# Patient Record
Sex: FEMALE | Race: BLACK OR AFRICAN AMERICAN | Employment: OTHER | URBAN - METROPOLITAN AREA
[De-identification: names, ages, dates, MRNs, and addresses within clinical notes are randomized per-mention and may not be internally consistent; named-entity substitution may affect disease eponyms.]

---

## 2018-09-12 ENCOUNTER — HOSPITAL ENCOUNTER (OUTPATIENT)
Dept: PHYSICAL THERAPY | Age: 73
Discharge: HOME | End: 2018-09-12
Payer: MEDICARE

## 2018-09-12 PROCEDURE — 97110 THERAPEUTIC EXERCISES: CPT

## 2018-09-12 PROCEDURE — 97162 PT EVAL MOD COMPLEX 30 MIN: CPT

## 2018-09-12 PROCEDURE — G8984 CARRY CURRENT STATUS: HCPCS

## 2018-09-12 PROCEDURE — G8985 CARRY GOAL STATUS: HCPCS

## 2018-09-12 NOTE — PROGRESS NOTES
Alyssa Pineda [address] [facility] PHYSICAL THERAPY 
[address] [facility] [address], [place], [address] - Phone: [phone]  Fax: [phone] PLAN OF CARE / STATEMENT OF MEDICAL NECESSITY FOR PHYSICAL THERAPY SERVICES Patient Name: Lisa Elliott : 1945 Medical  
Diagnosis: Left shoulder pain [M25.512] Treatment Diagnosis: Left shoulder pain Onset Date: 1 year ago AGE: 74 y.o. Referral Source: Jo Ann Tracy MD [facility]): 2018 Prior Hospitalization: See medical history Provider #: [id number] Prior Level of Function: Progressively limited left shoulder function Comorbidities: Hx throat cancer, HTN, Asthma Medications: Verified on Patient Summary List  
The Plan of Care and following information is based on the information from the initial evaluation.  
======================================================================================= Assessment / key information:  Patient is a 74 y.o. female who presents with chief complaint of left shoulder pain onset approximately 1 year ago. Patient denies numbness/tingling into LUE and demo no evidence of radicular symptoms during evaluation. Diagnosis: Left mechanical shoulder pain with capsular restrictions Posture: Significant Dowager's hump, left depression/rounded shoulder, left scapular anterior tilt and protraction, forward head with visible left/anterior cervical muscle turgor and tightness due to history of skin graft from left breast 6 years ago (). Impairments: Decreased left shoulder AROM/PROM in all planes (see objective below), posterior capsule tightness, dec UE strength left UE vs right UE. Functional limitations: Doing hair, reaching, ADLs, grooming Patient would benefit from skilled PT services to address these issues and improve the above mentioned impairments.   Thank you for this referral  
 
 
Objective Data: 
Shoulder ROM:  [] Unable to assess at this time  
  
 Painful Arc: [] Yes  [x] No  
                                         AROM                                                              PROM 
  Left Right   Left Right Flexion 95 145 Flexion 120 155 Extension NT NT Extension NT NT Scaption/ABD 90 140 Scaption/ 162 ER @ 45 Degrees 62 80 ER @ 0 Degrees 66 87 ER @ 90 Degrees NT NT ER @ 90 Degrees NT NT  
IR @ 90 Degrees     IR @ 90 Degrees      
IR HBB PSIS T11        
Functional ER To ear T3        
  
====================================================================================== Eval Complexity: History: HIGH Complexity :3+ comorbidities / personal factors will impact the outcome/ POC Exam:MEDIUM Complexity : 3 Standardized tests and measures addressing body structure, function, activity limitation and / or participation in recreation  Presentation: MEDIUM Complexity : Evolving with changing characteristics  Clinical Decision Making:MEDIUM Complexity : FOTO score of 26-74Overall Complexity:MEDIUM Problem List: pain affecting function, decrease ROM, decrease strength, edema affecting function, impaired gait/ balance, decrease ADL/ functional abilitiies, decrease activity tolerance, decrease flexibility/ joint mobility and decrease transfer abilities Treatment Plan may include any combination of the following: Therapeutic exercise, Therapeutic activities, Neuromuscular re-education, Physical agent/modality, Manual therapy, Patient education, Self Care training, Functional mobility training and Home safety training Patient / Family readiness to learn indicated by: asking questions, trying to perform skills and interest 
Persons(s) to be included in education: patient (P) and family support person (FSP);list daughter Barriers to Learning/Limitations: yes;  sensory deficits-speech (tracheostomy) Measures taken:   
Patient Goal (s): \"avoid surgery. \"  
Patient self reported health status: good Rehabilitation Potential: good ? Short Term Goals: To be accomplished in  3  Weeks: 1. Patient will be compliant with HEP in order to facilitate progression of therapeutic exercise and improve mobility 2. Patient will improve left shoulder flexion PROM >/= 145 degrees to improve ease with overhead activities 3. Patient demonstrate left shoulder function ER to occiput in order to improve ease with doing ahwk ? Long Term Goals: To be accomplished in  6  Weeks: 1. Patient will be independent with HEP in order to demonstrate ability to perform therapeutic exercises and continue progressing functional mobility upon D/C 
2. Patient will report no difficulty with doing hair in order to improve ease with self care duties 3. Patient will improve FOTO score to 63% to demonstrate a meaningful improvement in functional mobility and increased quality of life 4. Patient will improve left shoulder flexion/scaption AROM >/= 140 degrees without pain to improve ease with reaching and overhead tasks. Frequency / Duration:   Patient to be seen  2-3  times per week for 4-6  weeks: 
Patient / Caregiver education and instruction: self care, activity modification and exercises G-Codes (GP): Carry [id number] Current  CK= 40-59%  Goal  CJ= 20-39%. The severity rating is based on the FOTO Score Therapist Signature: Deondre Sheffield DPT Date: 9/12/2018 Certification Period: 9/12/18 - 12/11/18 Time: 7:23 AM  
=========================================================================================== I certify that the above Physical Therapy Services are being furnished while the patient is under my care. I agree with the treatment plan and certify that this therapy is necessary. Physician Signature:       Date:      Time:  Please sign and return to [facility] [employer] or you may fax the signed copy to [phone]. Thank you.

## 2018-09-12 NOTE — PROGRESS NOTES
PHYSICAL THERAPY - DAILY TREATMENT NOTE Patient Name: Raman Hamlin        Date: 2018 : 1945   YES Patient  Verified Visit #:   1     Insurance: Payor: Cullen Ice / Plan: Marina Caballero / Product Type: Medicare / In time: 11:05 Out time: 11:45 Total Treatment Time: 40 min Medicare Time Tracking (below) Total Timed Codes (min):  10 1:1 Treatment Time:  10 TREATMENT AREA =  Left shoulder pain SUBJECTIVE Pain Level (on 0 to 10 scale):  0  / 10 Medication Changes/New allergies or changes in medical history, any new surgeries or procedures? NO    If yes, update Summary List  
Subjective Functional Status/Changes:  []  No changes reported Patient's main complaint: left shoulder stiffness with AROM When did symptoms begin and how: 1 year ago insidious onset What reproduces symptoms: lifting arm especially to do hair What alleviates symptoms: Rest, Advil Occupation: Retired Goals: Do hair Improve ROM Decrease stiffness OBJECTIVE Diagnostic Tests: [] Lab work [] X-rays    [] CT [] MRI     [] Other: 
Results: 
 
Physical Therapy Evaluation - Shoulder A. Cervical Spine Dermatomes WNL [x] C/S ROM WNL [] Comments: Severely limited but symmetrical 
 
B. Observation: 
 
Posture: [x] Poor    [] Fair    [] Good    Describe: Atrophy of muscle tissue? [x] Yes  [] No  Location: 
 
Visible Defects? [x] Yes  [] No (ecchymosis, edema, inflammation, deformities) ROM:  [] Unable to assess at this time Painful Arc: [] Yes  [x] No  
                                         AROM                                                              PROM Left Right  Left Right Flexion 95 145 Flexion 120 155 Extension NT NT Extension NT NT Scaption/ABD 90 140 Scaption/ 162 ER @ 45 Degrees 62 80 ER @ 0 Degrees 66 87 ER @ 90 Degrees NT NT ER @ 90 Degrees NT NT  
IR @ 90 Degrees   IR @ 90 Degrees IR HBB PSIS T11 Functional ER To ear T3 End Feel Hard:  [x] Yes  [] No  
 
Strength:   [] Unable to assess at this time L (1-5) R (1-5) Pain Shoulder shrug 5 5 [] Yes   [x] No  
Abduction 4- 4 [] Yes   [x] No  
External Rotators 4- 4+ [] Yes   [x] No  
Internal Rotators 4- 4+ [] Yes   [x] No  
Supraspinatus (scapular plane) 4- 4 [] Yes   [x] No  
 
Palpation [] Min  [x] Mod  [] Severe    Location: posterior RTC 
 
10 min Therapeutic Exercise:  [x]  See flow sheet Rationale:      increase ROM, increase strength, improve coordination, improve balance and increase proprioception to improve the patients ability to perform ADLs and overhead activities with increased ease 
 
throughout min Patient Education:  Paul Mack []  Progressed/Changed HEP based on: Other Objective/Functional Measures: 
 
See objective measures above Post Treatment Pain Level (on 0 to 10) scale:   0  / 10 ASSESSMENT Assessment/Changes in Function:  
 
Patient History: High (Hx throat cancer, Age, Chronicity) Examination (low 1-2, mod 3+, high 4+): Moderate Clinical Presentation (low stable or uncomplicated, mod evolving or changing, high unstable or unpredictable): Moderate Clinical Decision Making (low , mod 26-74, high 1-25): FOTO Moderate 
  
[]  See Progress Note/Recertification Patient will continue to benefit from skilled PT services to modify and progress therapeutic interventions, address functional mobility deficits, address ROM deficits, address strength deficits, analyze and address soft tissue restrictions, analyze and cue movement patterns, analyze and modify body mechanics/ergonomics, assess and modify postural abnormalities and instruct in home and community integration to attain remaining goals. to attain remaining goals. Progress toward goals / Updated goals: 
 
See POC PLAN 
 
 [x]  Upgrade activities as tolerated YES Continue plan of care  
[]  Discharge due to :   
[]  Other:   
 
Therapist: Aakash Valdez DPT Date: 9/12/2018 Time: 7:23 AM

## 2018-09-19 ENCOUNTER — HOSPITAL ENCOUNTER (OUTPATIENT)
Dept: PHYSICAL THERAPY | Age: 73
Discharge: HOME | End: 2018-09-19
Payer: MEDICARE

## 2018-09-19 PROCEDURE — 97110 THERAPEUTIC EXERCISES: CPT

## 2018-09-19 PROCEDURE — 97140 MANUAL THERAPY 1/> REGIONS: CPT

## 2018-09-24 ENCOUNTER — HOSPITAL ENCOUNTER (OUTPATIENT)
Dept: PHYSICAL THERAPY | Age: 73
Discharge: HOME | End: 2018-09-24
Payer: MEDICARE

## 2018-09-24 PROCEDURE — 97110 THERAPEUTIC EXERCISES: CPT

## 2018-09-24 NOTE — PROGRESS NOTES
PHYSICAL THERAPY - DAILY TREATMENT NOTE Patient Name: Radha Snyedr        Date: 2018 : 1945   YES Patient  Verified Visit #:   3     Insurance: Payor: Lori Breath / Plan: Marina Caballero / Product Type: Medicare / In time: 11:00 Out time: 11:48 Total Treatment Time: 40 Medicare/BCBS [place] Time Tracking (below) Total Timed Codes (min):  40 1:1 Treatment Time:  40 TREATMENT AREA =  Left shd SUBJECTIVE Pain Level (on 0 to 10 scale):  0  / 10 Medication Changes/New allergies or changes in medical history, any new surgeries or procedures? NO    If yes, update Summary List  
Subjective Functional Status/Changes:  []  No changes reported \"I did good after last time. I wasn't sore\" OBJECTIVE 
 
38 
(30) min Therapeutic Exercise:  [x]  See flow sheet Rationale:      increase ROM, increase strength and posture to improve the patients ability to maximize function 10 min Manual Therapy: PROM to left shd, flex, abd and ER, STM to left UT and periscapular framing Rationale:      decrease pain, increase ROM and increase tissue extensibility to improve patient's ability to maximize posture and ROM 
 
 min Patient Education:  YES  Reviewed HEP []  Progressed/Changed HEP based on:   Cont HEP Other Objective/Functional Measures: 
Pt required break during session for rest room and tracheostomy care Left shd flex PROM 155deg, ABD 135deg, ER 60deg Post Treatment Pain Level (on 0 to 10) scale:   0  / 10 ASSESSMENT Assessment/Changes in Function:  
 
Poor kinesthetic sense, requires frequent cues for form and decreased tension  
  
[]  See Progress Note/Recertification Patient will continue to benefit from skilled PT services to analyze,, cue,, progress,, modify,, demonstrate,, instruct, and address, movement patterns,, therapeutic interventions,, postural abnormalities,, soft tissue restrictions,, ROM,, strength,, functional mobility,, body mechanics/ergonomics, and home and community integration, to attain remaining goals. Progress toward goals / Updated goals: · Short Term Goals: To be accomplished in  3  Weeks: 1. Patient will be compliant with HEP in order to facilitate progression of therapeutic exercise and improve mobility 2. Patient will improve left shoulder flexion PROM >/= 145 degrees to improve ease with overhead activitiesAdded Tband rows for improved posture to facilitate increased flex AROM-155deg today 3. Patient demonstrate left shoulder function ER to occiput in order to improve ease with doing hawk 
  
· Long Term Goals: To be accomplished in  6  Weeks: 1. Patient will be independent with HEP in order to demonstrate ability to perform therapeutic exercises and continue progressing functional mobility upon D/C 
2. Patient will report no difficulty with doing hair in order to improve ease with self care duties 3. Patient will improve FOTO score to 63% to demonstrate a meaningful improvement in functional mobility and increased quality of life 4. Patient will improve left shoulder flexion/scaption AROM >/= 140 degrees without pain to improve ease with reaching and overhead tasks. PLAN 
 
[]  Upgrade activities as tolerated YES Continue plan of care  
[]  Discharge due to :   
[]  Other:   
 
Therapist: Andrei Clay DPT Date: 9/24/2018 Time: 11:24 AM  
Future Appointments Date Time Provider An Almanza 9/26/2018 2:00 PM [facility] AT [facility]

## 2018-09-26 ENCOUNTER — HOSPITAL ENCOUNTER (OUTPATIENT)
Dept: PHYSICAL THERAPY | Age: 73
Discharge: HOME | End: 2018-09-26
Payer: MEDICARE

## 2018-09-26 PROCEDURE — 97110 THERAPEUTIC EXERCISES: CPT

## 2018-09-26 PROCEDURE — 97140 MANUAL THERAPY 1/> REGIONS: CPT

## 2018-09-26 NOTE — PROGRESS NOTES
PHYSICAL THERAPY - DAILY TREATMENT NOTE Patient Name: Peter Milan        Date: 2018 : 1945   YES Patient  Verified Visit #:   4     Insurance: Payor: Dominik Robles / Plan: Marina Tierneyshelby / Product Type: Medicare / In time: 2:00 Out time: 2:46 Total Treatment Time: 46 Medicare/BCBS Time Tracking (below) Total Timed Codes (min):  46 1:1 Treatment Time:  45 TREATMENT AREA =  Left shoulder pain [M25.512] SUBJECTIVE Pain Level (on 0 to 10 scale):  0  / 10 Medication Changes/New allergies or changes in medical history, any new surgeries or procedures? NO     If yes, update Summary List  
Subjective Functional Status/Changes:  []  No changes reported \"I feel a little looser. Easier to do my hair. \" OBJECTIVE 
 
33 (25) min Therapeutic Exercise:  [x]  See flow sheet Rationale:      increase ROM and increase strength to improve the patients ability to perform ADL's with improved overhead AROM. 13 min Manual Therapy: PROM flexion/abduction with contract-relax Rationale:      decrease pain, increase ROM and increase tissue extensibility to improve patient's ability to perform grooming ADL's with improved efficiency. min Patient Education:  YES  Reviewed HEP []  Progressed/Changed HEP based on:   Patient reports compliance Other Objective/Functional Measures: 
 
Pt reports discomfort and therefore an empty end feel beyond 155 degrees of flexion or abduction. Pt demonstrates minimal deficits with ER at this time for AROM. Progressed periscapular strengthening with supine exercises. Post Treatment Pain Level (on 0 to 10) scale:   0  / 10 ASSESSMENT Assessment/Changes in Function: WIll continue to progress strengthening/AROM per activity tolerance. Pt reports decreased symptoms with AROM. []  See Progress Note/Recertification Patient will continue to benefit from skilled PT services to modify and progress therapeutic interventions, address functional mobility deficits, address ROM deficits, address strength deficits, analyze and address soft tissue restrictions, analyze and cue movement patterns, analyze and modify body mechanics/ergonomics and assess and modify postural abnormalities to attain remaining goals. Progress toward goals / Updated goals: Addressed AROM with manual interventions. PLAN [x]  Upgrade activities as tolerated YES Continue plan of care  
[]  Discharge due to :   
[]  Other:   
 
Therapist: Margarette Espinoza Date: 9/26/2018 Time: 5:53 PM  
 
Future Appointments Date Time Provider An Almanza 10/1/2018 1:30 PM [facility]  
10/3/2018 1:00 PM Willy Wilson PT [facility]  
10/8/2018 1:30 PM [facility]  
10/10/2018 11:30 AM [facility]

## 2018-10-01 ENCOUNTER — HOSPITAL ENCOUNTER (OUTPATIENT)
Dept: PHYSICAL THERAPY | Age: 73
End: 2018-10-01
Payer: MEDICARE

## 2018-10-03 ENCOUNTER — HOSPITAL ENCOUNTER (OUTPATIENT)
Dept: PHYSICAL THERAPY | Age: 73
Discharge: HOME | End: 2018-10-03
Payer: MEDICARE

## 2018-10-03 PROCEDURE — 97110 THERAPEUTIC EXERCISES: CPT

## 2018-10-03 NOTE — PROGRESS NOTES
PHYSICAL THERAPY - DAILY TREATMENT NOTE    Patient Name: Barb Medel        Date: 10/3/2018  : 1945   YES Patient  Verified  Visit #:   5     Insurance: Payor: Noble Hahn / Plan: [place] PART [place] & [place] / Product Type: Medicare /      In time: 1:00 Out time: 1:40   Total Treatment Time: 40     Medicare/BCBS [place] Time Tracking (below)   Total Timed Codes (min):  40 1:1 Treatment Time:  40     TREATMENT AREA =  Left shd    SUBJECTIVE    Pain Level (on 0 to 10 scale):  0  / 10   Medication Changes/New allergies or changes in medical history, any new surgeries or procedures? NO    If yes, update Summary List   Subjective Functional Status/Changes:  []  No changes reported     \"I have more times when its better\"          OBJECTIVE      40 min Therapeutic Exercise:  [x]  See flow sheet   Rationale:      increase ROM and increase strength to improve the patients ability to perform ADLs      min Patient Education:  YES  Reviewed HEP   []  Progressed/Changed HEP based on:   Cont HEP     Other Objective/Functional Measures:    Pt continues to require assit with rep count to stay on task  Cont poor kinesthetic sense, require tactile, verbal and demonstration to perfrorm TE correctly       Post Treatment Pain Level (on 0 to 10) scale:   0  / 10     ASSESSMENT    Assessment/Changes in Function:     Pt reporting no pain with TE, only requires mod-max cueing indicating poor carry over at home for correct technique     []  See Progress Note/Recertification   Patient will continue to benefit from skilled PT services to analyze,, cue,, progress,, modify,, demonstrate,, instruct, and address, movement patterns,, therapeutic interventions,, postural abnormalities,, soft tissue restrictions,, ROM,, strength,, functional mobility,, body mechanics/ergonomics, and home and community integration, to attain remaining goals. Progress toward goals / Updated goals: · Short Term Goals:  To be accomplished in  3  Weeks:  1. Patient will be compliant with HEP in order to facilitate progression of therapeutic exercise and improve mobility  2. Patient will improve left shoulder flexion PROM >/= 145 degrees to improve ease with overhead activities  3. Patient demonstrate left shoulder function ER to occiput in order to improve ease with doing hairPt reports \"a little bit better doing hair\"     · Long Term Goals: To be accomplished in  6  Weeks:  1. Patient will be independent with HEP in order to demonstrate ability to perform therapeutic exercises and continue progressing functional mobility upon D/C  2. Patient will report no difficulty with doing hair in order to improve ease with self care duties  3. Patient will improve FOTO score to 63% to demonstrate a meaningful improvement in functional mobility and increased quality of life  4.  Patient will improve left shoulder flexion/scaption AROM >/= 140 degrees without pain to improve ease with reaching and overhead tasks.        PLAN    []  Upgrade activities as tolerated YES Continue plan of care   []  Discharge due to :    []  Other:      Therapist: Immanuel Ornelas DPT    Date: 10/3/2018 Time: 1:30 PM   Future Appointments  Date Time Provider An Almanza   10/8/2018 1:30 PM [address] [place] [address]   10/10/2018 11:30 AM [address] [place] [address]

## 2018-10-08 ENCOUNTER — HOSPITAL ENCOUNTER (OUTPATIENT)
Dept: PHYSICAL THERAPY | Age: 73
Discharge: HOME | End: 2018-10-08
Payer: MEDICARE

## 2018-10-08 PROCEDURE — 97110 THERAPEUTIC EXERCISES: CPT

## 2018-10-08 PROCEDURE — 97140 MANUAL THERAPY 1/> REGIONS: CPT

## 2018-10-08 NOTE — PROGRESS NOTES
PHYSICAL THERAPY - DAILY TREATMENT NOTE    Patient Name: Donna Valdovinos        Date: 10/8/2018  : 1945   YES Patient  Verified  Visit #:     Insurance: Payor: Joceline Samuel / Plan: [place] MED[place] PART A & B / Product Type: Medicare /      In time: 1:30 Out time: 2:30   Total Treatment Time: 60     Medicare/BCBS Time Tracking (below)   Total Timed Codes (min):  60 1:1 Treatment Time:  60     TREATMENT AREA =  Left shoulder pain [M25.512]    SUBJECTIVE    Pain Level (on 0 to 10 scale):  0  / 10   Medication Changes/New allergies or changes in medical history, any new surgeries or procedures? NO     If yes, update Summary List   Subjective Functional Status/Changes:  []  No changes reported     \"I feel a little better. The pain isn't at bad when I stretch. I can do my hair better but its still tight. \"          OBJECTIVE    Modalities Rationale: To improve activity tolerance for exercise performance and ADL's.    min [] Estim, type/location:                                      []  att     []  unatt     []  w/US     []  w/ice    []  w/heat    min []  Mechanical Traction: type/lbs                   []  pro   []  sup   []  int   []  cont    []  before manual    []  after manual    min []  Ultrasound, settings/location:      min []  Iontophoresis w/ dexamethasone, location:                                               []  take home patch       []  in clinic   NA min []  Ice     []  Heat    location/position:     min []  Vasopneumatic Device, press/temp:     min []  Other:    [x] Skin assessment post-treatment (if applicable):   [x]  intact    []  redness- no adverse reaction     []redness - adverse reaction:    40 min Therapeutic Exercise:  [x]  See flow sheet   Rationale:      increase ROM and increase strength to improve the patients ability to perform ADL's with improved periscapular stability.      20 min Manual Therapy: IASTM with releaser tool F3 sunburst pattern applied to 2 different places along pectoral incision   Rationale:      decrease pain, increase ROM and increase tissue extensibility to improve patient's ability to improve overhead UE AROM. NA min Self-Care:    Rationale: To improve skin health and prevent increased edema. min Patient Education:  YES  Reviewed HEP   []  Progressed/Changed HEP based on:   Patient reports compliance     Other Objective/Functional Measures:    Pt reports no pain following IASTM and demonstrates a 10 degree improvement in flexion and scaption AROM in supine. Pt's MMT still limiting standing AROM. Added standing UE flexion and scaption to addressed MMT deficits. Post Treatment Pain Level (on 0 to 10) scale:   0  / 10     ASSESSMENT    Assessment/Changes in Function:     WIll continue to progress strengthening/AROM per activity tolerance. Will reassess NV.     []  See Progress Note/Recertification   Patient will continue to benefit from skilled PT services to modify and progress therapeutic interventions, address functional mobility deficits, address ROM deficits, address strength deficits, analyze and address soft tissue restrictions, analyze and cue movement patterns, analyze and modify body mechanics/ergonomics and assess and modify postural abnormalities to attain remaining goals. Progress toward goals / Updated goals: Will reassess NV. Addressed AROM with standing AROM exercises.      PLAN    [x]  Upgrade activities as tolerated YES Continue plan of care   []  Discharge due to :    []  Other:      Therapist: Tommie Sykes    Date: 10/8/2018 Time: 2:32 PM     Future Appointments  Date Time Provider An Almanza   10/10/2018 11:30 AM [address] [place] [address]

## 2018-10-10 ENCOUNTER — HOSPITAL ENCOUNTER (OUTPATIENT)
Dept: PHYSICAL THERAPY | Age: 73
Discharge: HOME | End: 2018-10-10
Payer: MEDICARE

## 2018-10-10 PROCEDURE — G8985 CARRY GOAL STATUS: HCPCS

## 2018-10-10 PROCEDURE — G8986 CARRY D/C STATUS: HCPCS

## 2018-10-10 PROCEDURE — 97110 THERAPEUTIC EXERCISES: CPT

## 2018-10-10 PROCEDURE — 97140 MANUAL THERAPY 1/> REGIONS: CPT

## 2018-10-10 NOTE — PROGRESS NOTES
PHYSICAL THERAPY - DAILY TREATMENT NOTE    Patient Name: Jaylen Plummer        Date: 10/10/2018  : 1945   YES Patient  Verified  Visit #:     Insurance: Payor: Ross Bello / Plan: [place] MEDICARE PART A & B / Product Type: Medicare /      In time: 11:45 Out time: 12:45   Total Treatment Time: 60     Medicare/BCBS Time Tracking (below)   Total Timed Codes (min):  60 1:1 Treatment Time:  60     TREATMENT AREA =  Left shoulder pain [M25.512]    SUBJECTIVE    Pain Level (on 0 to 10 scale):  0  / 10   Medication Changes/New allergies or changes in medical history, any new surgeries or procedures? NO     If yes, update Summary List   Subjective Functional Status/Changes:  []  No changes reported     \"I can reach the back of my head better. \"          OBJECTIVE    Modalities Rationale:  To improve activity tolerance for exercise performance and ADL's.    min [] Estim, type/location:                                      []  att     []  unatt     []  w/US     []  w/ice    []  w/heat    min []  Mechanical Traction: type/lbs                   []  pro   []  sup   []  int   []  cont    []  before manual    []  after manual    min []  Ultrasound, settings/location:      min []  Iontophoresis w/ dexamethasone, location:                                               []  take home patch       []  in clinic   NA min []  Ice     []  Heat    location/position:     min []  Vasopneumatic Device, press/temp:     min []  Other:    [x] Skin assessment post-treatment (if applicable):   [x]  intact    []  redness- no adverse reaction     []redness - adverse reaction:    35 min Therapeutic Exercise:  [x]  See flow sheet   Rationale:      increase ROM and increase strength to improve the patients ability to perform ADL's with improved AWROM     25 min Manual Therapy: IASTM with releaser tool F3 sunburst pattern applied to 2 different places along pectoral incision and axillary region, manual lymph drainage to head and neck left side   Rationale:      decrease pain, increase ROM, increase tissue extensibility and decrease edema  to improve patient's ability to perform overhead ADL's with improved flexibility. min Patient Education:  YES  Reviewed HEP   []  Progressed/Changed HEP based on:   Patient reports compliance     Other Objective/Functional Measures:    Pt demonstrates swelling on the left side of her head and face. Pt reports she reported symptoms to her pharmacist who suggested taking benadryl. Therapist educated the pt to call her PCP immediately after this appointment to report her reaction to spilling the hair dye. Demonstrated manual lymph drainage techniques to the pt to decrease symptoms. See progress note for report on UE. Post Treatment Pain Level (on 0 to 10) scale:   0  / 10     ASSESSMENT    Assessment/Changes in Function:     WIll continue to progress strengthening/AROM per activity tolerance. []  See Progress Note/Recertification   Patient will continue to benefit from skilled PT services to modify and progress therapeutic interventions, address functional mobility deficits, address ROM deficits, address strength deficits, analyze and address soft tissue restrictions, analyze and cue movement patterns, analyze and modify body mechanics/ergonomics and assess and modify postural abnormalities to attain remaining goals. Progress toward goals / Updated goals:    See progress note. Addressed AROM and PROM with manual interventions described above.      PLAN    [x]  Upgrade activities as tolerated YES Continue plan of care   []  Discharge due to :    []  Other:      Therapist: Robert Sinha    Date: 10/10/2018 Time: 4:26 PM     Future Appointments  Date Time Provider An Almanza   10/15/2018 11:30 AM [address] [place] [address] Drive   10/17/2018 1:30 PM Lexy Montoya PT [place] [address] [address]   10/23/2018 1:30 PM Lexy Montoya PT [address]   10/24/2018 1:00 PM Lexy Montoya PT [facility]   10/29/2018 11:30 AM [facility]   10/31/2018 1:30 PM Scooter Mora PT [facility]

## 2018-10-10 NOTE — PROGRESS NOTES
[facility] PHYSICAL THERAPY  [address] [place] [address], [address] - Phone: [phone]  Fax: [phone] [address]          Patient Name: Favio Lambert : 1945   Treatment/Medical Diagnosis: Left shoulder pain [M25.512]   Onset Date:     Referral Source: Sushil Mckeon MD Start of Care [facility]): 18   Prior Hospitalization: See Medical History Provider #: [id number]   Prior Level of Function: Progressively limited left shoulder function   Comorbidities: Hx throat cancer, HTN, Asthma   Medications: Verified on Patient Summary List   Visits from [facility]: 7 Missed Visits: 0     Goal/Measure of Progress Goal Met? 1. Patient demonstrate left shoulder function ER to occiput in order to improve ease with doing hawk   Status at last Eval: unable Current Status: able yes   2. Patient will improve left shoulder flexion PROM >/= 145 degrees to improve ease with overhead activities   Status at last Eval: 120 Current Status: 155 yes   3. Patient will improve left shoulder flexion/scaption AROM >/= 140 degrees without pain to improve ease with reaching and overhead tasks. Status at last Eval: 90-95 Current Status: 115 standing  140 supine progressing   4. Patient will improve FOTO score to 63% to demonstrate a meaningful improvement in functional mobility and increased quality of life   Status at last Eval: 49 Current Status: 68 yes     Therapy has consisted of Patient's POC has consisted of therex, therapeutic activities, manual therapy prn, modalities prn, pt. education, and a comprehensive HEP.  Treatment strategies used to address functional mobility deficits, ROM deficits, strength deficits, analyze and address soft tissue restrictions, analyze and cue movement patterns, analyze and modify body mechanics/ergonomics, assess and modify postural abnormalities and instruct in home and community integration. Key Functional Changes/Progress: Pt's FOTO score improved to 68 indicating 32% limitation in functional ability. The pt reports the most improved AROM tolerance following instrument-assisted soft tissue massage to break down scar tissue adhesions. The pt still demonstrates AROM deficits and would benefit from improved overhead AROM to improve efficiency with ADL's. Problem List: pain affecting function, decrease ROM, decrease strength, decrease ADL/ functional abilitiies, decrease activity tolerance and decrease flexibility/ joint mobility   Treatment Plan may include any combination of the following: Therapeutic exercise, Therapeutic activities, Neuromuscular re-education, Physical agent/modality, Manual therapy, Patient education and Self Care training   Goals for this certification period include and are to be achieved in   3-4  weeks:  1. Patient will be independent with HEP in order to demonstrate ability to perform therapeutic exercises and continue progressing functional mobility upon D/C  2. Patient will improve left shoulder flexion/scaption AROM >/= 140 degrees without pain to improve ease with reaching and overhead tasks. 3.   Patient will report no difficulty with doing hair in order to improve ease with self care duties  Frequency / Duration:   Patient to be seen   2-3   times per week for   4    weeks:  G-Codes (GP): Carry:  [id number] Goal  CJ= 20-39%   D/C  CJ= 20-39%. The severity rating is based on the FOTO Score    Assessments/Recommendations: The patient would continue to benefit from skilled interventions to address the above mentioned functional deficits. See above for more details. If you have any questions/comments please contact us directly at [phone]. Thank you for allowing us to assist in the care of your patient.     Therapist Signature: Joao Navarro DPT Date: 41/95/8212   Certification Period:  Reporting Period: 9/12/18 - 12/11/18 9/12/18 - 10/10/18 Time: 4:33 PM   NOTE TO PHYSICIAN:  PLEASE COMPLETE THE ORDERS BELOW AND FAX TO   [facility]: [phone]. If you are unable to process this request in 24 hours please contact our office: [phone].    ___ I have read the above report and request that my patient continue as recommended.   ___ I have read the above report and request that my patient continue therapy with the following changes/special instructions: ________________________________________________   ___ I have read the above report and request that my patient be discharged from therapy.      Physician Signature:        Date:       Time:

## 2018-10-15 ENCOUNTER — HOSPITAL ENCOUNTER (OUTPATIENT)
Dept: PHYSICAL THERAPY | Age: 73
Discharge: HOME | End: 2018-10-15
Payer: MEDICARE

## 2018-10-15 PROCEDURE — G8978 MOBILITY CURRENT STATUS: HCPCS

## 2018-10-15 PROCEDURE — G8979 MOBILITY GOAL STATUS: HCPCS

## 2018-10-15 PROCEDURE — 97110 THERAPEUTIC EXERCISES: CPT

## 2018-10-15 NOTE — PROGRESS NOTES
PHYSICAL THERAPY - DAILY TREATMENT NOTE    Patient Name: Anabel Bhatti        Date: 10/15/2018  : 1945   YES Patient  Verified  Visit #:     Insurance: Payor: Ozzie Bahena / Plan: [place] MEDICARE PART A & B / Product Type: Medicare /      In time: 11:25 Out time: 12:08   Total Treatment Time: 43     Medicare/BCBS Time Tracking (below)   Total Timed Codes (min):  43 1:1 Treatment Time:  28     TREATMENT AREA =  Left shoulder pain [M25.512]    SUBJECTIVE    Pain Level (on 0 to 10 scale):  0  / 10   Medication Changes/New allergies or changes in medical history, any new surgeries or procedures? NO     If yes, update Summary List   Subjective Functional Status/Changes:  []  No changes reported     \"I can lift my arm better, but my breast area was sore. \"          OBJECTIVE    43 (28) min Therapeutic Exercise:  [x]  See flow sheet   Rationale:      increase ROM and increase strength to improve the patients ability to perform ADL's with improved overhead mobility and stability      min Patient Education:  YES  Reviewed HEP   []  Progressed/Changed HEP based on:   Patient reports compliance     Other Objective/Functional Measures:    Pt can perform AAROM with assist of contralateral UE to 140 deg to perform eccentric lowering. Added eccentric lowering to pt's HEP. Performed eccentric lowering at the wall ladder as well. Post Treatment Pain Level (on 0 to 10) scale:   0  / 10     ASSESSMENT    Assessment/Changes in Function:     New G-Codes: Mobility Current CK, Goal CJ: Flexion AROM    WIll continue to progress strengthening/AROM per activity tolerance. Pt demonstrated improvement in AROM following AROM this visit. Will continue to address with HEP changes.      []  See Progress Note/Recertification   Patient will continue to benefit from skilled PT services to modify and progress therapeutic interventions, address functional mobility deficits, address ROM deficits, address strength deficits, analyze and address soft tissue restrictions, analyze and cue movement patterns, analyze and modify body mechanics/ergonomics and assess and modify postural abnormalities to attain remaining goals. Progress toward goals / Updated goals: Addressed LTG 2 with AAROM.      PLAN    [x]  Upgrade activities as tolerated YES Continue plan of care   []  Discharge due to :    []  Other:      Therapist: Eda Landis    Date: 10/15/2018 Time: 11:57 AM     Future Appointments  Date Time Provider An Almanza   10/17/2018 1:30 PM Sunday Partida [facility]   10/23/2018 1:30 PM Sunday Partida [facility]   10/24/2018 1:00 PM Sunday Partida [facility]   10/29/2018 11:30 AM Eda Landis [facility]   10/31/2018 1:30 PM Sunday Partida [facility]

## 2018-10-17 ENCOUNTER — HOSPITAL ENCOUNTER (OUTPATIENT)
Dept: PHYSICAL THERAPY | Age: 73
Discharge: HOME | End: 2018-10-17
Payer: MEDICARE

## 2018-10-17 PROCEDURE — 97110 THERAPEUTIC EXERCISES: CPT

## 2018-10-17 NOTE — PROGRESS NOTES
PHYSICAL THERAPY - DAILY TREATMENT NOTE    Patient Name: Teresa Long        Date: 10/17/2018  : 1945   YES Patient  Verified  Visit #:     Insurance: Payor: Jairo Filler / Plan: [place] MEDICARE PART A & B / Product Type: Medicare /      In time: 134 Out time: 2:10   Total Treatment Time: 36     Medicare/BCBS [place] Time Tracking (below)   Total Timed Codes (min):  36 1:1 Treatment Time:  8     TREATMENT AREA =  Left shd    SUBJECTIVE    Pain Level (on 0 to 10 scale):  0  / 10   Medication Changes/New allergies or changes in medical history, any new surgeries or procedures? NO    If yes, update Summary List   Subjective Functional Status/Changes:  []  No changes reported     \"Doing better\"          OBJECTIVE      36 min Therapeutic Exercise:  [x]  See flow sheet   Rationale:      increase ROM and increase strength to improve the patients ability to perform ADls with increased ease      min Patient Education:  YES  Reviewed HEP   []  Progressed/Changed HEP based on:   Cont HEP     Other Objective/Functional Measures: Added side<>side wall wash and wall push ups today     Post Treatment Pain Level (on 0 to 10) scale:   0  / 10     ASSESSMENT    Assessment/Changes in Function:     Increased IR ROM noted today     []  See Progress Note/Recertification   Patient will continue to benefit from skilled PT services to analyze,, cue,, progress,, modify,, demonstrate,, instruct, and address, movement patterns,, therapeutic interventions,, postural abnormalities,, soft tissue restrictions,, ROM,, strength,, functional mobility,, body mechanics/ergonomics, and home and community integration, to attain remaining goals. Progress toward goals / Updated goals:    1. Patient will be independent with HEP in order to demonstrate ability to perform therapeutic exercises and continue progressing functional mobility upon D/C  2.    Patient will improve left shoulder flexion/scaption AROM >/= 140 degrees without pain to improve ease with reaching and overhead tasks.   3.   Patient will report no difficulty with doing hair in order to improve ease with self care duties         PLAN    []  Upgrade activities as tolerated YES Continue plan of care   []  Discharge due to :    []  Other:      Therapist: Kayla Topete DPT    Date: 10/17/2018 Time: 3:09 PM     Future Appointments   Date Time Provider An Almanza   10/23/2018  1:30 PM Sheyla Drew [facility]   10/24/2018  1:00 PM Sheyla Drew [facility]   10/29/2018 11:30 AM Cedric [facility]   10/31/2018  1:30 PM Isaura Feldman [facility]

## 2018-10-23 ENCOUNTER — HOSPITAL ENCOUNTER (OUTPATIENT)
Dept: PHYSICAL THERAPY | Age: 73
Discharge: HOME | End: 2018-10-23
Payer: MEDICARE

## 2018-10-23 PROCEDURE — 97110 THERAPEUTIC EXERCISES: CPT

## 2018-10-23 PROCEDURE — 97140 MANUAL THERAPY 1/> REGIONS: CPT

## 2018-10-23 NOTE — PROGRESS NOTES
PHYSICAL THERAPY - DAILY TREATMENT NOTE    Patient Name: Demar Mancia        Date: 10/23/2018  : 1945   YES Patient  Verified  Visit #:   10   of   17  Insurance: Payor: Misti Katie / Plan: [place] MEDICARE PART A & B / Product Type: Medicare /      In time: 1:30 Out time: 225   Total Treatment Time: 55     Medicare/BCBS [place] Time Tracking (below)   Total Timed Codes (min):  55 1:1 Treatment Time:  40     TREATMENT AREA =  Left shd    SUBJECTIVE    Pain Level (on 0 to 10 scale):  0  / 10   Medication Changes/New allergies or changes in medical history, any new surgeries or procedures?     NO    If yes, update Summary List   Subjective Functional Status/Changes:  []  No changes reported     \"It feels good today\"          OBJECTIVE      40  (25) min Therapeutic Exercise:  [x]  See flow sheet   Rationale:      increase ROM and increase strength to improve the patients ability to perform ADLs with increased ease     15 min Manual Therapy: Scap mobs, STM to UT and left lateral pec, PROM to left shd   Rationale:      decrease pain, increase ROM and increase tissue extensibility to improve patient's ability to maximize functional mobility     min Patient Education:  YES  Reviewed HEP   []  Progressed/Changed HEP based on:   Cont HEP     Other Objective/Functional Measures:  PROM on small red wedge for Fowlers  VC for form throughout session  Challenged keeping elbow at side for ER TBand  LImited ROM noted with open book at wall     Post Treatment Pain Level (on 0 to 10) scale:   0  / 10     ASSESSMENT    Assessment/Changes in Function:     Pt with difficulty relaxing during PROM, Noted increased ROM during wall wash compared to during manual     []  See Progress Note/Recertification   Patient will continue to benefit from skilled PT services to analyze,, cue,, progress,, modify,, demonstrate,, instruct, and address, movement patterns,, therapeutic interventions,, postural abnormalities,, soft tissue restrictions,, ROM,, strength,, functional mobility,, body mechanics/ergonomics, and home and community integration, to attain remaining goals. Progress toward goals / Updated goals:  · Goals for this certification period include and are to be achieved in   3-4  weeks:  1. Patient will be independent with HEP in order to demonstrate ability to perform therapeutic exercises and continue progressing functional mobility upon D/C  2. Patient will improve left shoulder flexion/scaption AROM >/= 140 degrees without pain to improve ease with reaching and overhead tasks.   3.   Patient will report no difficulty with doing hair in order to improve ease with self care duties         PLAN    []  Upgrade activities as tolerated YES Continue plan of care   []  Discharge due to :    []  Other:      Therapist: Musa Peters DPT    Date: 10/23/2018 Time: 1:35 PM     Future Appointments   Date Time Provider An Almanza   10/24/2018  1:00 PM Carlitos Rebolledo [facility]   10/29/2018 11:30 AM [place] [facility]   10/31/2018  1:30 PM Chelly Feldman [facility]

## 2018-10-24 ENCOUNTER — HOSPITAL ENCOUNTER (OUTPATIENT)
Dept: PHYSICAL THERAPY | Age: 73
Discharge: HOME | End: 2018-10-24
Payer: MEDICARE

## 2018-10-24 PROCEDURE — 97110 THERAPEUTIC EXERCISES: CPT

## 2018-10-24 NOTE — PROGRESS NOTES
PHYSICAL THERAPY - DAILY TREATMENT NOTE    Patient Name: Anabel Bhatti        Date: 10/24/2018  : 1945   YES Patient  Verified  Visit #:     Insurance: Payor: VA MEDICARE / Plan: VA MEDICARE PART A & B / Product Type: Medicare /      In time: 1:00 Out time: 1:40   Total Treatment Time: 40     Medicare/BCBS [place] Time Tracking (below)   Total Timed Codes (min):  40 1:1 Treatment Time:  40     TREATMENT AREA =  Left shd pain    SUBJECTIVE    Pain Level (on 0 to 10 scale):  0  / 10   Medication Changes/New allergies or changes in medical history, any new surgeries or procedures? NO    If yes, update Summary List   Subjective Functional Status/Changes:  []  No changes reported     \"I think I will be good to be done after two more visits. My daiOmPromptter is going to buy me 1# to use for my arms\"          OBJECTIVE    40 min Therapeutic Exercise:  [x]  See flow sheet   Rationale:      increase ROM and increase strength to improve the patients ability to perform ADLs      min Patient Education:  YES  Reviewed HEP   []  Progressed/Changed HEP based on:   Cont HEP, will issue DC HEP at last visit     Other Objective/Functional Measures:    VC for decreased UT tension during shd 3 way  VC for proper arm placement during shd 3 way  Added T/S ext for posture     Post Treatment Pain Level (on 0 to 10) scale:   0  / 10     ASSESSMENT    Assessment/Changes in Function:     Progressing ROM and tolerance, as well as decreased report of discomfort, does cont to require moderate VC     []  See Progress Note/Recertification   Patient will continue to benefit from skilled PT services to analyze,, cue,, progress,, modify,, demonstrate,, instruct, and address, movement patterns,, therapeutic interventions,, postural abnormalities,, soft tissue restrictions,, ROM,, strength,, functional mobility,, body mechanics/ergonomics, and home and community integration, to attain remaining goals.    Progress toward goals / Updated goals:   Added T/S ext to facilitate [place] shd reach     PLAN    []  Upgrade activities as tolerated YES Continue plan of care   []  Discharge due to :    []  Other:      Therapist: Abi Bowman DPT    Date: 10/24/2018 Time: 1:39 PM     Future Appointments   Date Time Provider An Almanza   10/29/2018 11:30 AM [facility]   10/31/2018  1:30 PM [place], Sena Councilman, PT [facility]

## 2018-10-29 ENCOUNTER — HOSPITAL ENCOUNTER (OUTPATIENT)
Dept: PHYSICAL THERAPY | Age: 73
Discharge: HOME | End: 2018-10-29
Payer: MEDICARE

## 2018-10-29 PROCEDURE — 97110 THERAPEUTIC EXERCISES: CPT

## 2018-10-29 NOTE — PROGRESS NOTES
PHYSICAL THERAPY - DAILY TREATMENT NOTE    Patient Name: David Murphy        Date: 10/29/2018  : 1945   YES Patient  Verified  Visit #:     Insurance: Payor: Rik Francisco / Plan: VA MEDICARE PART A & B / Product Type: Medicare /      In time: 11:35 Out time: 12:17   Total Treatment Time: 42     Medicare/BCBS Time Tracking (below)   Total Timed Codes (min):  42 1:1 Treatment Time:  15     TREATMENT AREA =  Left shoulder pain [M25.512]    SUBJECTIVE    Pain Level (on 0 to 10 scale):  0  / 10   Medication Changes/New allergies or changes in medical history, any new surgeries or procedures? NO     If yes, update Summary List   Subjective Functional Status/Changes:  []  No changes reported     \"I feel good with all of my exercises. I can do my hair. \"          OBJECTIVE    Modalities Rationale:  To improve activity tolerance for exercise performance and ADL's.    min [] Estim, type/location:                                      []  att     []  unatt     []  w/US     []  w/ice    []  w/heat    min []  Mechanical Traction: type/lbs                   []  pro   []  sup   []  int   []  cont    []  before manual    []  after manual    min []  Ultrasound, settings/location:      min []  Iontophoresis w/ dexamethasone, location:                                               []  take home patch       []  in clinic   NA min []  Ice     []  Heat    location/position:     min []  Vasopneumatic Device, press/temp:     min []  Other:    [x] Skin assessment post-treatment (if applicable):   [x]  intact    []  redness- no adverse reaction     []redness - adverse reaction:    42 (15) min Therapeutic Exercise:  [x]  See flow sheet   Rationale:      increase ROM and increase strength to improve the patients ability to perform ADL's with improved AROM to chest height.      min Patient Education:  YES  Reviewed HEP   []  Progressed/Changed HEP based on:   Patient reports compliance     Other Objective/Functional Measures:    Pt can perform flexion to 85 degrees, scaption to 90 degrees, but abduction to only 35 degrees. Pt still requires cueing with abduction AROM. Post Treatment Pain Level (on 0 to 10) scale:   0  / 10     ASSESSMENT    Assessment/Changes in Function:     Pt is preparing for discharge next visit. WIll update HEP and assure independence with discharge plan next visit. []  See Progress Note/Recertification   Patient will continue to benefit from skilled PT services to modify and progress therapeutic interventions, address functional mobility deficits, address ROM deficits, address strength deficits, analyze and address soft tissue restrictions, analyze and cue movement patterns, analyze and modify body mechanics/ergonomics and assess and modify postural abnormalities to attain remaining goals. Progress toward goals / Updated goals: Addressed LTG 1 with updated HEP.      PLAN    [x]  Upgrade activities as tolerated YES Continue plan of care   []  Discharge due to :    []  Other:      Therapist: Osvaldo Washington    Date: 10/29/2018 Time: 12:05 PM     Future Appointments   Date Time Provider An Almanza   10/31/2018  1:30 PM Noel Diop, PT [facility]

## 2018-10-31 ENCOUNTER — HOSPITAL ENCOUNTER (OUTPATIENT)
Dept: PHYSICAL THERAPY | Age: 73
Discharge: HOME | End: 2018-10-31
Payer: MEDICARE

## 2018-10-31 PROCEDURE — G8985 CARRY GOAL STATUS: HCPCS

## 2018-10-31 PROCEDURE — 97110 THERAPEUTIC EXERCISES: CPT

## 2018-10-31 PROCEDURE — G8986 CARRY D/C STATUS: HCPCS

## 2018-10-31 NOTE — PROGRESS NOTES
PHYSICAL THERAPY - DAILY TREATMENT NOTE    Patient Name: Favio Lambert        Date: 10/31/2018  : 1945   YES Patient  Verified  Visit #:   15   of   13  Insurance: Payor: Sneha Wright / Plan: [place] & B / Product Type: Medicare /      In time: 1:30 Out time: 2:00   Total Treatment Time: 30     Medicare/BCBS [place] Time Tracking (below)   Total Timed Codes (min):  30 1:1 Treatment Time:  30     TREATMENT AREA =  Left shd    SUBJECTIVE    Pain Level (on 0 to 10 scale):  0  / 10   Medication Changes/New allergies or changes in medical history, any new surgeries or procedures? YES    If yes, update Summary List   Subjective Functional Status/Changes:  []  No changes reported     \"I can do it\"          OBJECTIVE      30 min Therapeutic Exercise:  [x]  See flow sheet   Rationale:      increase ROM and increase strength to improve the patients ability to complete ADLs, specifically    Wash hair   min Patient Education:  YES  Reviewed HEP   []  Progressed/Changed HEP based on:   Issued HEP     Other Objective/Functional Measures:    AROM flex 105deg  AAROM with eccentric lowering at wall 125deg  AROM abd 100deg    Independent with HEP  Reports no difficulty with washing hair     Post Treatment Pain Level (on 0 to 10) scale:   0  / 10     ASSESSMENT    Assessment/Changes in Function:     See DC note     []  See Progress Note/Recertification   Patient will continue to benefit from skilled PT services to analyze,, cue,, progress,, modify,, demonstrate,, instruct, and address, movement patterns,, therapeutic interventions,, postural abnormalities,, soft tissue restrictions,, ROM,, strength,, functional mobility,, body mechanics/ergonomics, and home and community integration, to attain remaining goals.    Progress toward goals / Updated goals:    See DC note     PLAN    []  Upgrade activities as tolerated YES Continue plan of care   []  Discharge due to :    []  Other:      Therapist: Bob Franks GEOVANI Feldman    Date: 10/31/2018 Time: 1:37 PM   No future appointments.

## 2018-10-31 NOTE — PROGRESS NOTES
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 1. Patient will be independent with HEP in order to demonstrate ability to perform therapeutic exercises and continue progressing functional mobility upon D/C     Status at last Eval: Reports compliance Current Status: Independent with use of pictures yes   2. Patient will improve left shoulder flexion/scaption AROM >/= 140 degrees without pain to improve ease with reaching and overhead tasks   Status at last Eval: 115deg Current Status: AAROM at wall 125deg progressing   3. Patient will report no difficulty with doing hair in order to improve ease with self care duties   Status at last Eval: Reports difficulty Current Status: Reports no difficulty yes     Key Functional Changes/Progress: Pt progressed well with PT and has reached a plateau in progress. She reports no pain upon presenting to PT session and demo no difficulty other then interm cues for correct technique. At last reassess her PROM flex was 155deg and supine shd flex 140deg. Currently pt able to demo 125deg AAROM in stand at the wall with controled eccentric lowering. Pt no reports no difficulty with doing her hair and voices readiness for DC to updated HEP.  Thank you for this referral!       G-Codes (GP): Carry   Goal  CK= 40-59%   D/C  CK= 40-59%. The severity rating is based on the Other AROM flexion    Assessments/Recommendations: Other: Plateau in progress    If you have any questions/comments please contact us directly at [phone]. Thank you for allowing us to assist in the care of your patient. Therapist Signature: Merry Hoover DPT Date: 10/31/2018   Reporting Period: 10/10/2018-10/31/18 Time: 5:58 PM      Certification Period: 9/12/18-10/31/18       NOTE TO PHYSICIAN:  PLEASE COMPLETE THE ORDERS BELOW AND FAX TO   [facility] Physical Therapy: [phone] If you are unable to process this request in 24 hours please contact our office: [phone].    ___ I have read the above report and request that my patient be discharged from therapy.      Physician Signature:        Date:       Time:

## 2024-01-01 NOTE — PROGRESS NOTES
PHYSICAL THERAPY - DAILY TREATMENT NOTE Patient Name: Tammie Browne        Date: 2018 : 1945   YES Patient  Verified Visit #:   2     Insurance: Payor: Bharat Peres / Plan: [address] / Product Type: Medicare / In time: 1:35 Out time: 2:15 Total Treatment Time: 40 Medicare/BCBS Time Tracking (below) Total Timed Codes (min):  40 1:1 Treatment Time:  32 TREATMENT AREA =  Left shoulder pain [M25.512] SUBJECTIVE Pain Level (on 0 to 10 scale):  0  / 10 Medication Changes/New allergies or changes in medical history, any new surgeries or procedures? NO     If yes, update Summary List  
Subjective Functional Status/Changes:  []  No changes reported \"Things are fine. I did exercises at home. \" OBJECTIVE 
 
25 (17) min Therapeutic Exercise:  [x]  See flow sheet Rationale:      increase ROM and increase strength to improve the patients ability to perform ADL's with improved UE and thoracic mobility 15  min Manual Therapy: Manual assist AAROM and PROM with contract relax: left UE flexion, abduction Rationale:      decrease pain, increase ROM and increase tissue extensibility to improve patient's ability to improve efficiency with overhead ADL's. 
 
 min Patient Education:  YES  Reviewed HEP []  Progressed/Changed HEP based on:   Patient reports compliance Other Objective/Functional Measures: 
 
Pt demonstrates poor AROM in standing but can demonstrate up to 160 degrees flexion and 150 degrees abduction in sidelying. Pt demonstrates full ER AROM in sidelying as well. Post Treatment Pain Level (on 0 to 10) scale:   3  / 10 ASSESSMENT Assessment/Changes in Function: WIll continue to progress strengthening/AROM per activity tolerance. []  See Progress Note/Recertification Patient will continue to benefit from skilled PT services to modify and Physical Therapy    Visit Type: treatment  Born at Gestational Age: 29w2d and now corrected age 32w 2d    Present at bedside: Nurse    SUBJECTIVE  RN in agreement to work with patient for therapy session.  Patient / Family Goals: meet developmental milestones    Pain   RN completed pain assessment during care time    OBJECTIVE      Autonomic Stability:  - Heart Rate: stable  - Respiratory: stable  - Oxygen Saturations: stable  - Color: stable  - Temperature: stable  - Visceral/GI: stable    Neurobehavioral:  - Consolability: consoled by swaddling and consoled by sustained/containment touch  Infant Stress Cues  - Physiological: yawning  - Motor: sitting on air/leg extension, bracing and stop sign/salute  - Behavioral: grimace  Self regulation  - Achieved with assistance of: containment, boundaries and non-nutritive suck on pacifier    Observation  - Bed Type: isolette  - Current Positioning Device: Frog and Bendy    Behavior  - irritable with handling     Posture  Head Position / Preference  - Supine: head rotated left and head rotated right  LUE  - position: shoulder elevated, shoulder retracted, arm extended and arm at side  RUE  - position: shoulder elevated, shoulder retracted, arm extended and arm at side  Resting trunk position: trunk midline and symmetrical  Rib cage: elevated  LLE:   - hip external rotation and hip abducted position  RLE  - hip external rotation and hip abducted position    Respiratory   - Support: ventilator  tolerating well  TENZIN cannula      Range of Motion   Comments / Details: Patient displays Within normal limit Range of motion for their Gestational Age.       Muscle Tone: typical tone  Patient displays Within normal limits muscle tone for their Gestational Age.        Movement  Overall analysis of movement during session: uncoordinated and jerky      Interventions    Neuromuscular Re-Education:   -facilitate symmetry, facilitate hand to mouth activity, facilitate anti-gravity movement,  facilitate alignment and facilitate grasping     Positioning: positional supports and alternate positioning    Education/Training: reinforcement of midline activity, reinforce postural symmetry, encourage quiet wakefulness, promote smooth, controlled movement and reinforce physiological flexion    Skilled input: Facilitation         ASSESSMENT    - Impairments: tolerance to handling, tolerance to positioning, tolerance to sensory stimulation, state regulation, motor control, movement quality and head shape  - Functional Limitations: transitional movement, sleep/wake cycle and asymmetry of movement   Patient displays left cervical rotation preference this session with some movements into right rotation with Assist. She displays shoulder elevation postures with Upper extremities moving into extension and abduction. She requires containment to move into flexion and midline position for calm. She was then able to transition into quiet alert state for periods up to 1 min.        Discharge Recommendations     Recommend ongoing PT while inpatient to promote age appropriate neuromotor and sensorimotor development and to provide ongoing parent education.    Skilled therapy Patient will benefit from skilled therapy to address listed impairments and functional limitations.  - Predicted patient presentation: Moderate (evolving) - Patient comorbidities and complexities, as defined above, may have varying impact on steady progress for prescribed plan of care.    PLAN    Suggestions for next session as indicated: PT Frequency: 3-4 x per week     Interventions: positioning, state regulation, handling, progress motor skills and facilitation of head control  Agreement to plan and goals: Parent/caregiver agrees with goals and treatment plan        GOALS  Long Term Goals (LTGs): (to be met by time of discharge from hospital)  - Demonstrate symmetrical rounded head shape development.   - Maintain head midline in supine for >2 seconds.  progress therapeutic interventions, address functional mobility deficits, address ROM deficits, address strength deficits, analyze and address soft tissue restrictions, analyze and cue movement patterns, analyze and modify body mechanics/ergonomics and assess and modify postural abnormalities to attain remaining goals. Progress toward goals / Updated goals: Addressed AROM with sidelying AROM exercises. PLAN [x]  Upgrade activities as tolerated YES Continue plan of care  
[]  Discharge due to :   
[]  Other:   
 
Therapist: Omid Dixon Date: 9/19/2018 Time: 4:29 PM  
 
Future Appointments Date Time Provider An Almanza 9/24/2018 11:00 AM Odilia Cunha PT [facility] AT [facility]  
9/26/2018 2:00 PM Omid Dixon [facility]  
 
 
 
   - Tolerates position changes, handling, upright position with stable vitals.   - Patient will exhibit LE flexion without boundaries for >2 seconds.   - Patient will demonstrate age appropriate tone.    - Patient will demonstrate age appropriate movement patterns.   Documented in the chart in the following areas: Assessment/Plan.    Patient at End of Session:   Location: isolette  Safety measures: infant safety band  Handoff to: nurse      Therapy procedure time and total treatment time can be found documented on the Time Entry flowsheet